# Patient Record
Sex: MALE | Race: WHITE | Employment: FULL TIME | URBAN - METROPOLITAN AREA
[De-identification: names, ages, dates, MRNs, and addresses within clinical notes are randomized per-mention and may not be internally consistent; named-entity substitution may affect disease eponyms.]

---

## 2020-02-16 ENCOUNTER — HOSPITAL ENCOUNTER (EMERGENCY)
Age: 35
Discharge: HOME | End: 2020-02-17

## 2020-02-16 VITALS
OXYGEN SATURATION: 100 % | HEART RATE: 110 BPM | BODY MASS INDEX: 20.99 KG/M2 | WEIGHT: 155 LBS | HEIGHT: 72 IN | DIASTOLIC BLOOD PRESSURE: 84 MMHG | RESPIRATION RATE: 18 BRPM | SYSTOLIC BLOOD PRESSURE: 131 MMHG | TEMPERATURE: 97.3 F

## 2020-02-16 PROCEDURE — 99283 EMERGENCY DEPT VISIT LOW MDM: CPT

## 2020-02-16 RX ORDER — ONDANSETRON 2 MG/ML
4 INJECTION INTRAMUSCULAR; INTRAVENOUS ONCE
Status: DISCONTINUED | OUTPATIENT
Start: 2020-02-16 | End: 2020-02-16

## 2020-02-16 RX ORDER — PANTOPRAZOLE SODIUM 40 MG/10ML
40 INJECTION, POWDER, LYOPHILIZED, FOR SOLUTION INTRAVENOUS ONCE
Status: DISCONTINUED | OUTPATIENT
Start: 2020-02-16 | End: 2020-02-16

## 2020-02-16 ASSESSMENT — ENCOUNTER SYMPTOMS
BACK PAIN: 0
ABDOMINAL PAIN: 0
CONSTIPATION: 0
COLOR CHANGE: 0
NAUSEA: 0
COUGH: 0
SHORTNESS OF BREATH: 0
RESPIRATORY NEGATIVE: 1
VOMITING: 0
PHOTOPHOBIA: 0
DIARRHEA: 0

## 2020-02-17 NOTE — ED PROVIDER NOTES
[address] [facility]        Pt Name: Farheen Rod  MRN: [id number]  [place] 1985  Date of evaluation: 2/16/2020  Provider: KOBE Mcbride  PCP: No primary care provider on file. This patient was not seen and evaluated by the attending physician but available for consultation as needed. CHIEF COMPLAINT       Chief Complaint   Patient presents with    Alcohol Intoxication     pt brought to ed [place] [facility] after the  found him at a bar and were unable to get him into his house so they brought him here, pt admits to drinking and smoking marijuana        HISTORY OF PRESENT ILLNESS   (Location, Timing/Onset, Context/Setting, Quality, Duration, Modifying Factors, Severity, Associated Signs and Symptoms)  Note limiting factors. Farheen Rod is a 36 y.o. male with no significant past medical history who presents to the ED with complaint of alcohol intoxication. Patient was brought in by [facility] for alcohol intoxication. Patient was at a bar. Heavily intoxicated and apparently the  took him home but they were unable to get him in his house so EMS was called he was brought here. Patient does admit to having wine, beer and tequila shots this evening. States he also smoked marijuana. Denies any fall or complaints at this time. Denies headache or head injury. Denies visual changes, speech disturbances, numbness/tingling, chest pain, shortness of breath, lightheadedness/dizziness, dental pain, nausea/vomiting, urinary symptoms or changes in bowel movements. Denies any other drug usage. Denies suicidal or homicidal ideation. Patient states he does not have a ride at this time. Nursing Notes were all reviewed and agreed with or any disagreements were addressed in the HPI.     REVIEW OF SYSTEMS    (2-9 systems for level 4, 10 or more for level 5)     Review of Systems   Constitutional: Negative for activity change, and atraumatic. Right Ear: External ear normal.      Left Ear: External ear normal.   Eyes:      General:         Right eye: No discharge. Left eye: No discharge. Extraocular Movements: Extraocular movements intact. Conjunctiva/sclera: Conjunctivae normal.      Pupils: Pupils are equal, round, and reactive to light. Neck:      Musculoskeletal: Normal range of motion and neck supple. No neck rigidity or muscular tenderness. Cardiovascular:      Rate and Rhythm: Regular rhythm. Tachycardia present. Pulses: Normal pulses. Heart sounds: Normal heart sounds. No murmur. No friction rub. No gallop. Comments: 2+ radial pulses bilaterally. No pedal edema. No calf tenderness. No JVD. Pulmonary:      Effort: Pulmonary effort is normal. No respiratory distress. Breath sounds: Normal breath sounds. No stridor. No wheezing, rhonchi or rales. Chest:      Chest wall: No tenderness. Abdominal:      General: Abdomen is flat. Bowel sounds are normal. There is no distension. Palpations: Abdomen is soft. There is no mass. Tenderness: There is no abdominal tenderness. There is no right CVA tenderness, left CVA tenderness, guarding or rebound. Hernia: No hernia is present. Musculoskeletal: Normal range of motion. Lymphadenopathy:      Cervical: No cervical adenopathy. Skin:     General: Skin is warm and dry. Coloration: Skin is not pale. Findings: No erythema. Neurological:      Mental Status: He is alert. GCS: GCS eye subscore is 4. GCS verbal subscore is 5. GCS motor subscore is 6. Cranial Nerves: No cranial nerve deficit. Sensory: No sensory deficit. Motor: Motor function is intact. Comments: Gait deferred at this time. Patient does appear clinically heavily intoxicated. Alert at this time.   Able to ambulate without assistance and difficulty to the bathroom with staff for standby assist.   Psychiatric:         Behavior: this time. Did offer IV for Zofran and fluids here in the ED but patient declined this at this time. Patient appears to have no emergent medical concern at this time other than being intoxicated at this time. Patient unfortunately does not have a ride at this time. Patient will be opts here in the ED for clinical sobriety or until he can find a sober ride to come take him home. Patient's father arrived to the emergency department and agreed to take patient home at this time. Still does appear slightly intoxicated but sober father at bedside agreeable to take patient home at this time. Believe this is reasonable. Patient suffering from what appears to be alcohol intoxication. Would not of been brought to the emergency department had he been able to get into his house. Father agreeable to take patient home at this time. No signs of injury and do not believe blood work or imaging indicated this time. No evidence of emergent medical problem at this time. FINAL IMPRESSION      1. Acute alcoholic intoxication without complication [facility])          DISPOSITION/PLAN   DISPOSITION Decision To Discharge 02/17/2020 12:19:08 AM      PATIENT REFERREDTO:  [facility] Emergency Department  [address] [facility] [address]  [phone]  Go to   As needed, If symptoms worsen      DISCHARGE MEDICATIONS:  There are no discharge medications for this patient. DISCONTINUED MEDICATIONS:  There are no discharge medications for this patient.              (Please note that portions of this note were completed with a voice recognition program.  Efforts were made to edit the dictations but occasionally words are mis-transcribed.)    KOBE Nichols (electronically signed)          KOBE Marmolejo  02/17/20 [id number]

## 2020-02-17 NOTE — ED NOTES
Discharge instructions given, patient acknowledged understanding, patient ambulated out of ed with father, no wants or needs at this time      Sharri Quispe, RN  02/17/20 [id number]

## 2020-02-17 NOTE — ED NOTES
Patient would not let me attempt an IV at this time, RIKKI BULLOCK informed at this time     Alaina Anand RN  02/16/20 [id number]